# Patient Record
Sex: FEMALE | Race: WHITE | NOT HISPANIC OR LATINO
[De-identification: names, ages, dates, MRNs, and addresses within clinical notes are randomized per-mention and may not be internally consistent; named-entity substitution may affect disease eponyms.]

---

## 2022-01-03 PROBLEM — Z00.00 ENCOUNTER FOR PREVENTIVE HEALTH EXAMINATION: Status: ACTIVE | Noted: 2022-01-03

## 2022-01-04 ENCOUNTER — APPOINTMENT (OUTPATIENT)
Dept: PEDIATRIC ORTHOPEDIC SURGERY | Facility: CLINIC | Age: 57
End: 2022-01-04
Payer: COMMERCIAL

## 2022-01-04 VITALS — WEIGHT: 140 LBS | HEIGHT: 68 IN | BODY MASS INDEX: 21.22 KG/M2

## 2022-01-04 DIAGNOSIS — Z78.9 OTHER SPECIFIED HEALTH STATUS: ICD-10-CM

## 2022-01-04 DIAGNOSIS — Z85.9 PERSONAL HISTORY OF MALIGNANT NEOPLASM, UNSPECIFIED: ICD-10-CM

## 2022-01-04 DIAGNOSIS — M54.16 RADICULOPATHY, LUMBAR REGION: ICD-10-CM

## 2022-01-04 DIAGNOSIS — Z80.9 FAMILY HISTORY OF MALIGNANT NEOPLASM, UNSPECIFIED: ICD-10-CM

## 2022-01-04 DIAGNOSIS — Z72.89 OTHER PROBLEMS RELATED TO LIFESTYLE: ICD-10-CM

## 2022-01-04 PROCEDURE — 72100 X-RAY EXAM L-S SPINE 2/3 VWS: CPT

## 2022-01-04 PROCEDURE — 99202 OFFICE O/P NEW SF 15 MIN: CPT

## 2022-01-04 RX ORDER — CYCLOBENZAPRINE HYDROCHLORIDE 5 MG/1
5 TABLET, FILM COATED ORAL TWICE DAILY
Qty: 30 | Refills: 1 | Status: ACTIVE | COMMUNITY
Start: 2022-01-04 | End: 1900-01-01

## 2022-01-04 RX ORDER — MELOXICAM 15 MG/1
15 TABLET ORAL
Qty: 30 | Refills: 1 | Status: ACTIVE | COMMUNITY
Start: 2022-01-04 | End: 1900-01-01

## 2022-01-04 NOTE — HISTORY OF PRESENT ILLNESS
[de-identified] : This 56-year-old healthy woman is seen today with a 1 month history of low back pain radiating into the right lower extremity to the knee.  No obvious history of trauma precipitating event.  No numbness paresthesias motor weakness bladder or bowel dysfunction.  Prior to this she had been doing quite well with no significant musculoskeletal complaints.  She is allergic to sulfa

## 2022-01-04 NOTE — PHYSICAL EXAM
[de-identified] : Exam today reveals a straight spine level pelvis no ligament discrepancy and a normal gait.  She does maintain good motion to the lumbar spine noted with discomfort at the limits.  Spasm and tenderness is noted more so on the right side of the lumbar segment with mild triggering present.  Posterior pelvic wall reveals discomfort in the region of the sciatic notch.  Both lower extremities are symmetric in appearance without atrophy and move well at all individual joints.  Straight leg raising is negative though uncomfortable on the right.  She is neurologically intact with no focal deficits noted.\par \par X-rays of the lumbar spine taken today reveal narrowing of the lower lumbar interspaces most pronounced at L5-S1.  Degenerative changes both SI joints.

## 2022-01-04 NOTE — ASSESSMENT
[FreeTextEntry1] : Impression: Right lumbar radiculitis, myalgias lumbar spine.\par \par This patient will be treated Mobic along with Flexeril and formal physical therapy.  She will return in 1 month if she is not progressing.